# Patient Record
Sex: FEMALE | Race: WHITE | NOT HISPANIC OR LATINO | URBAN - METROPOLITAN AREA
[De-identification: names, ages, dates, MRNs, and addresses within clinical notes are randomized per-mention and may not be internally consistent; named-entity substitution may affect disease eponyms.]

---

## 2018-04-30 ENCOUNTER — EMERGENCY (EMERGENCY)
Facility: HOSPITAL | Age: 58
LOS: 1 days | Discharge: HOME | End: 2018-04-30
Attending: EMERGENCY MEDICINE | Admitting: EMERGENCY MEDICINE
Payer: COMMERCIAL

## 2018-04-30 VITALS
OXYGEN SATURATION: 98 % | RESPIRATION RATE: 18 BRPM | DIASTOLIC BLOOD PRESSURE: 76 MMHG | WEIGHT: 154.98 LBS | TEMPERATURE: 98 F | SYSTOLIC BLOOD PRESSURE: 143 MMHG | HEART RATE: 74 BPM

## 2018-04-30 VITALS
RESPIRATION RATE: 18 BRPM | SYSTOLIC BLOOD PRESSURE: 118 MMHG | HEART RATE: 70 BPM | TEMPERATURE: 98 F | DIASTOLIC BLOOD PRESSURE: 66 MMHG | OXYGEN SATURATION: 98 %
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
## 2018-04-30 DIAGNOSIS — R22.0 LOCALIZED SWELLING, MASS AND LUMP, HEAD: ICD-10-CM

## 2018-04-30 DIAGNOSIS — T78.1XXA OTHER ADVERSE FOOD REACTIONS, NOT ELSEWHERE CLASSIFIED, INITIAL ENCOUNTER: ICD-10-CM

## 2018-04-30 DIAGNOSIS — Z79.899 OTHER LONG TERM (CURRENT) DRUG THERAPY: ICD-10-CM

## 2018-04-30 DIAGNOSIS — L29.9 PRURITUS, UNSPECIFIED: ICD-10-CM
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
## 2018-04-30 DIAGNOSIS — R21 RASH AND OTHER NONSPECIFIC SKIN ERUPTION: ICD-10-CM

## 2018-04-30 PROCEDURE — 96375 TX/PRO/DX INJ NEW DRUG ADDON: CPT

## 2018-04-30 PROCEDURE — 99284 EMERGENCY DEPT VISIT MOD MDM: CPT

## 2018-04-30 PROCEDURE — 96374 THER/PROPH/DIAG INJ IV PUSH: CPT

## 2018-04-30 PROCEDURE — 99284 EMERGENCY DEPT VISIT MOD MDM: CPT | Mod: 25

## 2018-04-30 RX ORDER — DIPHENHYDRAMINE HCL 50 MG
2 CAPSULE ORAL
Qty: 20 | Refills: 0 | OUTPATIENT
Start: 2018-04-30

## 2018-04-30 RX ORDER — DIPHENHYDRAMINE HCL 50 MG
50 CAPSULE ORAL ONCE
Qty: 0 | Refills: 0 | Status: COMPLETED | OUTPATIENT
Start: 2018-04-30 | End: 2018-04-30

## 2018-04-30 RX ORDER — EPINEPHRINE 0.3 MG/.3ML
0.3 INJECTION INTRAMUSCULAR; SUBCUTANEOUS
Qty: 1 | Refills: 0 | OUTPATIENT
Start: 2018-04-30

## 2018-04-30 RX ORDER — DEXAMETHASONE 0.5 MG/5ML
10 ELIXIR ORAL ONCE
Qty: 0 | Refills: 0 | Status: COMPLETED | OUTPATIENT
Start: 2018-04-30 | End: 2018-04-30

## 2018-04-30 RX ORDER — FAMOTIDINE 10 MG/ML
20 INJECTION INTRAVENOUS ONCE
Qty: 0 | Refills: 0 | Status: COMPLETED | OUTPATIENT
Start: 2018-04-30 | End: 2018-04-30

## 2018-04-30 RX ADMIN — Medication 50 MILLIGRAM(S): at 14:22

## 2018-04-30 RX ADMIN — Medication 102 MILLIGRAM(S): at 14:28

## 2018-04-30 RX ADMIN — FAMOTIDINE 20 MILLIGRAM(S): 10 INJECTION INTRAVENOUS at 14:28

## 2018-04-30 NOTE — ED ADULT NURSE REASSESSMENT NOTE - NS ED NURSE REASSESS COMMENT FT1
Pt refused to change into gown. Pt refused IV placement at this time. Pt educated and advised of risks of allergic reaction and necessity of wearing gown and placing IV line. Will continue to monitor.

## 2018-04-30 NOTE — ED PROVIDER NOTE - PHYSICAL EXAMINATION
GEN: Well appearing, well nourished, awake, alert, oriented to person, place, time/situation and in no apparent distress.  ENT: Airway patent, Nasal mucosa clear. Mouth with normal mucosa. No oropharyngeal swelling, no dysphonia, no drooling or stridor. Mild left lateral cheek and lateral submadibular swelling noted, no crepitus, induration, fluctuance, or cellulitis noted. No meningismus.  EYES: Clear bilaterally.  RESPIRATORY: Breathing comfortably with normal RR. No w/c/r.   CARDIAC: Regular rate and rhythm  ABDOMEN: Soft, nontender, +bowel sounds, no rebound, rigidity, or guarding.  MSK: Range of motion is not limited, no deformities noted.  NEURO: Alert and oriented, no focal deficits.  SKIN: Skin normal color for race, warm, dry and intact. mild urticaria rash to left neck and ext noted.   PSYCH: Alert and oriented to person, place, time/situation. normal mood and affect. no apparent risk to self or others.

## 2018-04-30 NOTE — ED ADULT NURSE NOTE - CHPI ED SYMPTOMS POS
SWELLING OF FACE, TONGUE/swelling of left side of jaw, no throat swelling, no swelling of lips or tongue

## 2018-04-30 NOTE — ED ADULT NURSE REASSESSMENT NOTE - NS ED NURSE REASSESS COMMENT FT1
Pt reports "feeling better," redness and itching on neck no longer present, pt denies numbness and tingling to lips, swelling to the left side of the jaw still present. Pt denies SOB, difficulty breathing, itching in the throat, speaking in full sentences, no drooling. Will continue to monitor.

## 2018-04-30 NOTE — ED ADULT NURSE NOTE - OBJECTIVE STATEMENT
Pt reports eating oysters 20 min ago, noticed swelling on the left side of the jaw and numbness and tingling to the lips. Pt denies difficulty swallowing, no drooling, no difficulty breathing, denies SOB, chest pain, no hives present, denies itching.

## 2018-04-30 NOTE — ED ADULT TRIAGE NOTE - OTHER COMPLAINTS
denies any trouble breathing, lung sounds clear. swelling to left jaw noted. denies any trouble breathing, lung sounds clear. swelling to left jaw noted. tinging to lips

## 2018-04-30 NOTE — ED PROVIDER NOTE - OBJECTIVE STATEMENT
rash and left jaw swelling after eatig oyster, no airway issues 57F with no sig PMhx who p/w allergic reaction after eating raw oysters today. Approx 1 hours ago she noticed swelling to her left jaw area and an itchy rash to her neck, no sob, wheezing, hoarseness, throat closing, stridor, drooling, dizziness, n/v, syncope, or CP. She denies h/o allergic reaction in thepast. She did not take anything pta. No other complaints.

## 2018-04-30 NOTE — ED ADULT NURSE NOTE - CHPI ED SYMPTOMS NEG
no vomiting/no rash/no cough/no wheezing/no nausea/no difficulty swallowing/no throat itching/no shortness of breath/no difficulty breathing

## 2018-04-30 NOTE — ED PROVIDER NOTE - MEDICAL DECISION MAKING DETAILS
Pt with mild allergic reaction after eating oysters, no airway involvement, no swelling to oropharyngeal space, lips, tongue or under tongue. Pt treated with benadryl, Pepcid, and decadron and significant improvement noted. She was DC'd with Rxs including epipen, she was advised to stay away from raw seafood and f/u with PMD and allergist.   The patient is stable for DC. They were advised to call their PMD for prompt outpatient follow up. Return precautions were discussed. The patient was advised to return to the ER for any concerning or worsening symptoms.

## 2018-05-01 RX ORDER — FAMOTIDINE 10 MG/ML
1 INJECTION INTRAVENOUS
Qty: 8 | Refills: 0 | OUTPATIENT
Start: 2018-05-01 | End: 2018-05-04

## 2022-02-15 NOTE — ED PROVIDER NOTE - CROS ED GI ALL NEG
---------------------  From: Crissy Kim CMA (eRx Pool ([id number] [facility]))   To: Floyd Baker MD;     Sent: 7/1/2019 7:57:09 AM CDT  Subject: FW: Medication Management   Due Date/Time: 6/29/2019 9:38:00 AM CDT             ** Patient matched by Crissy Kim CMA on 7/1/2019 7:56:56 AM CDT **      ------------------------------------------  From: [facility]  To: Floyd Baker MD  Sent: June 28, 2019 9:38:09 AM CDT  Subject: Medication Management  Due: June 29, 2019 9:38:09 AM CDT    ** On Hold Pending Signature **  Drug: oxyCODONE (oxyCODONE 10 mg oral tablet)  Take one (1) tablet four times daily  Quantity: 120 EA      Days Supply: 0         Refills: 0  Substitutions Allowed  Notes from Pharmacy:     Dispensed Drug: oxyCODONE (oxyCODONE 10 mg oral tablet)  Take one (1) tablet four times daily  Quantity: 120 EA      Days Supply: 0         Refills: 0  Substitutions Allowed  Notes from Pharmacy:   ---------------------------------------------------------------  From: Floyd Baker MD   To: [facility]    Sent: 7/1/2019 10:05:41 AM CDT  Subject: FW: Medication Management     ** Submitted: **  Complete:oxyCODONE (oxyCODONE 10 mg oral tablet)   Signed by Floyd Baker MD  7/1/2019 10:05:00 AM    ** Approved **  oxyCODONE (OxyCODONE HCl Tablet 10 MG)  Take one (1) tablet four times daily  Qty:  120 EA        Days Supply:  0          Substitutions Allowed     Route To Pharmacy - [facility]  
negative...

## 2023-11-02 NOTE — ED PROVIDER NOTE - CROS ED RESP ALL NEG
